# Patient Record
Sex: MALE | Race: BLACK OR AFRICAN AMERICAN | URBAN - METROPOLITAN AREA
[De-identification: names, ages, dates, MRNs, and addresses within clinical notes are randomized per-mention and may not be internally consistent; named-entity substitution may affect disease eponyms.]

---

## 2019-05-31 ENCOUNTER — NEW PATIENT (OUTPATIENT)
Dept: URBAN - METROPOLITAN AREA CLINIC 24 | Facility: CLINIC | Age: 49
End: 2019-05-31
Payer: COMMERCIAL

## 2019-05-31 DIAGNOSIS — H52.212 IRREGULAR ASTIGMATISM, LEFT EYE: ICD-10-CM

## 2019-05-31 PROCEDURE — 92025 CPTRIZED CORNEAL TOPOGRAPHY: CPT | Performed by: OPHTHALMOLOGY

## 2019-05-31 PROCEDURE — 92002 INTRM OPH EXAM NEW PATIENT: CPT | Performed by: OPHTHALMOLOGY

## 2019-05-31 RX ORDER — VALACYCLOVIR HYDROCHLORIDE 1 G/1
TABLET, FILM COATED ORAL
Qty: 180 | Refills: 2 | Status: INACTIVE
Start: 2019-05-31 | End: 2019-10-18

## 2019-05-31 ASSESSMENT — KERATOMETRY
OS: 47.05
OD: 44.19

## 2019-06-10 ENCOUNTER — FOLLOW UP ESTABLISHED (OUTPATIENT)
Dept: URBAN - METROPOLITAN AREA CLINIC 10 | Facility: CLINIC | Age: 49
End: 2019-06-10
Payer: COMMERCIAL

## 2019-06-10 PROCEDURE — 92012 INTRM OPH EXAM EST PATIENT: CPT | Performed by: OPHTHALMOLOGY

## 2019-06-10 ASSESSMENT — INTRAOCULAR PRESSURE: OS: 14

## 2019-06-18 ENCOUNTER — FOLLOW UP ESTABLISHED (OUTPATIENT)
Dept: URBAN - METROPOLITAN AREA CLINIC 10 | Facility: CLINIC | Age: 49
End: 2019-06-18
Payer: COMMERCIAL

## 2019-06-18 PROCEDURE — 92012 INTRM OPH EXAM EST PATIENT: CPT | Performed by: OPHTHALMOLOGY

## 2019-06-18 RX ORDER — PREDNISOLONE ACETATE 10 MG/ML
1 % SUSPENSION/ DROPS OPHTHALMIC
Qty: 1 | Refills: 1 | Status: INACTIVE
Start: 2019-06-18 | End: 2020-09-04

## 2019-07-02 ENCOUNTER — FOLLOW UP ESTABLISHED (OUTPATIENT)
Dept: URBAN - METROPOLITAN AREA CLINIC 10 | Facility: CLINIC | Age: 49
End: 2019-07-02
Payer: COMMERCIAL

## 2019-07-02 DIAGNOSIS — B00.52 HERPESVIRAL KERATITIS: Primary | ICD-10-CM

## 2019-07-02 DIAGNOSIS — H17.12 CENTRAL CORNEAL OPACITY, LEFT EYE: ICD-10-CM

## 2019-07-02 PROCEDURE — 92012 INTRM OPH EXAM EST PATIENT: CPT | Performed by: OPHTHALMOLOGY

## 2019-07-02 ASSESSMENT — INTRAOCULAR PRESSURE: OS: 17

## 2019-07-22 ENCOUNTER — FOLLOW UP ESTABLISHED (OUTPATIENT)
Dept: URBAN - METROPOLITAN AREA CLINIC 10 | Facility: CLINIC | Age: 49
End: 2019-07-22
Payer: COMMERCIAL

## 2019-07-22 PROCEDURE — 68761 CLOSE TEAR DUCT OPENING: CPT | Performed by: OPHTHALMOLOGY

## 2019-07-22 PROCEDURE — 92012 INTRM OPH EXAM EST PATIENT: CPT | Performed by: OPHTHALMOLOGY

## 2019-07-22 ASSESSMENT — INTRAOCULAR PRESSURE: OS: 8

## 2019-08-13 ENCOUNTER — FOLLOW UP ESTABLISHED (OUTPATIENT)
Dept: URBAN - METROPOLITAN AREA CLINIC 10 | Facility: CLINIC | Age: 49
End: 2019-08-13
Payer: COMMERCIAL

## 2019-08-13 PROCEDURE — 92012 INTRM OPH EXAM EST PATIENT: CPT | Performed by: OPHTHALMOLOGY

## 2019-09-06 ENCOUNTER — FOLLOW UP ESTABLISHED (OUTPATIENT)
Dept: URBAN - METROPOLITAN AREA CLINIC 10 | Facility: CLINIC | Age: 49
End: 2019-09-06
Payer: COMMERCIAL

## 2019-09-06 DIAGNOSIS — H16.142 PUNCTATE KERATITIS, LEFT EYE: ICD-10-CM

## 2019-09-06 PROCEDURE — 92012 INTRM OPH EXAM EST PATIENT: CPT | Performed by: OPHTHALMOLOGY

## 2019-10-18 ENCOUNTER — FOLLOW UP ESTABLISHED (OUTPATIENT)
Dept: URBAN - METROPOLITAN AREA CLINIC 24 | Facility: CLINIC | Age: 49
End: 2019-10-18
Payer: COMMERCIAL

## 2019-10-18 PROCEDURE — 92012 INTRM OPH EXAM EST PATIENT: CPT | Performed by: OPHTHALMOLOGY

## 2019-10-18 RX ORDER — VALACYCLOVIR HYDROCHLORIDE 1 G/1
TABLET, FILM COATED ORAL
Qty: 90 | Refills: 2 | Status: INACTIVE
Start: 2019-10-18 | End: 2020-02-13

## 2019-10-18 ASSESSMENT — INTRAOCULAR PRESSURE
OS: 28
OD: 8
OS: 16

## 2019-12-19 ENCOUNTER — FOLLOW UP ESTABLISHED (OUTPATIENT)
Dept: URBAN - METROPOLITAN AREA CLINIC 24 | Facility: CLINIC | Age: 49
End: 2019-12-19
Payer: COMMERCIAL

## 2019-12-19 PROCEDURE — 92012 INTRM OPH EXAM EST PATIENT: CPT | Performed by: OPHTHALMOLOGY

## 2019-12-19 ASSESSMENT — INTRAOCULAR PRESSURE
OS: 12
OD: 17

## 2020-02-13 ENCOUNTER — FOLLOW UP ESTABLISHED (OUTPATIENT)
Dept: URBAN - METROPOLITAN AREA CLINIC 24 | Facility: CLINIC | Age: 50
End: 2020-02-13
Payer: COMMERCIAL

## 2020-02-13 PROCEDURE — 92012 INTRM OPH EXAM EST PATIENT: CPT | Performed by: OPHTHALMOLOGY

## 2020-02-13 RX ORDER — VALACYCLOVIR HYDROCHLORIDE 1 G/1
TABLET, FILM COATED ORAL
Qty: 90 | Refills: 6 | Status: INACTIVE
Start: 2020-02-13 | End: 2020-09-04

## 2020-02-13 ASSESSMENT — INTRAOCULAR PRESSURE
OS: 14
OD: 18

## 2020-09-04 ENCOUNTER — NEW PATIENT (OUTPATIENT)
Dept: URBAN - METROPOLITAN AREA CLINIC 30 | Facility: CLINIC | Age: 50
End: 2020-09-04
Payer: COMMERCIAL

## 2020-09-04 DIAGNOSIS — H35.40 UNSPECIFIED PERIPHERAL RETINAL DEGENERATION: ICD-10-CM

## 2020-09-04 DIAGNOSIS — H25.813 COMBINED FORMS OF AGE-RELATED CATARACT, BILATERAL: ICD-10-CM

## 2020-09-04 DIAGNOSIS — E11.39 TYPE 2 DIABETES MELLITUS WITH OPHTHALMIC COMPLICAT: ICD-10-CM

## 2020-09-04 PROCEDURE — 92004 COMPRE OPH EXAM NEW PT 1/>: CPT | Performed by: OPTOMETRIST

## 2020-09-04 PROCEDURE — 92134 CPTRZ OPH DX IMG PST SGM RTA: CPT | Performed by: OPTOMETRIST

## 2020-09-04 RX ORDER — VALACYCLOVIR HYDROCHLORIDE 1 G/1
TABLET, FILM COATED ORAL
Qty: 90 | Refills: 2 | Status: ACTIVE
Start: 2020-09-04

## 2020-09-04 ASSESSMENT — INTRAOCULAR PRESSURE
OD: 12
OS: 12

## 2020-09-04 ASSESSMENT — VISUAL ACUITY
OS: 20/60
OD: 20/20

## 2020-09-04 ASSESSMENT — KERATOMETRY
OS: 45.24
OD: 44.05

## 2021-05-03 ENCOUNTER — OFFICE VISIT (OUTPATIENT)
Dept: URBAN - METROPOLITAN AREA CLINIC 30 | Facility: CLINIC | Age: 51
End: 2021-05-03
Payer: COMMERCIAL

## 2021-05-03 DIAGNOSIS — Z79.4 LONG TERM (CURRENT) USE OF INSULIN: ICD-10-CM

## 2021-05-03 DIAGNOSIS — E11.3313 TYPE 2 DIAB W MODERATE NONPRLF DIAB RTNOP W MACULAR EDEMA, BILATERAL: ICD-10-CM

## 2021-05-03 DIAGNOSIS — E11.3393 DIABETES MELLITUS TYPE 2 WITH MODERATE NON-PROLIFE: ICD-10-CM

## 2021-05-03 PROCEDURE — 92134 CPTRZ OPH DX IMG PST SGM RTA: CPT | Performed by: OPHTHALMOLOGY

## 2021-05-03 PROCEDURE — 99214 OFFICE O/P EST MOD 30 MIN: CPT | Performed by: OPHTHALMOLOGY

## 2021-05-03 ASSESSMENT — INTRAOCULAR PRESSURE
OD: 9
OS: 9

## 2021-05-03 NOTE — IMPRESSION/PLAN
Impression: Long term (current) use of insulin Plan: see above diabetic note A1c TOO HIGH in 11s in '21.

## 2021-05-03 NOTE — IMPRESSION/PLAN
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 Plan: Few cysts DME OS > OD. Pt is striving to improve diet and lower BG. High-resolution imaging and retinal examination confirm DM retinopathy but there is no macular edema requiring injections or laser. For now, observation is appropriate. RETINA will provide vigilant attention to the retinal issues. Non-retinal care w primary eye team.  Pt MUST follow strict rec's for DM care. RTC 4mo FA baseline OS. Monitor. No inj / Frances Chema RECHECK BP and BG   AMD DRUSEN too.

## 2021-05-03 NOTE — IMPRESSION/PLAN
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 Rec resume Valtrex 1g po QD. KEEP f/u DR. Thomas.

## 2021-10-18 ENCOUNTER — OFFICE VISIT (OUTPATIENT)
Dept: URBAN - METROPOLITAN AREA CLINIC 30 | Facility: CLINIC | Age: 51
End: 2021-10-18
Payer: COMMERCIAL

## 2021-10-18 DIAGNOSIS — H35.363 DRUSEN (DEGENERATIVE) OF MACULA, BILATERAL: ICD-10-CM

## 2021-10-18 PROCEDURE — 92250 FUNDUS PHOTOGRAPHY W/I&R: CPT | Performed by: OPHTHALMOLOGY

## 2021-10-18 PROCEDURE — 92134 CPTRZ OPH DX IMG PST SGM RTA: CPT | Performed by: OPHTHALMOLOGY

## 2021-10-18 PROCEDURE — 92235 FLUORESCEIN ANGRPH MLTIFRAME: CPT | Performed by: OPHTHALMOLOGY

## 2021-10-18 PROCEDURE — 99214 OFFICE O/P EST MOD 30 MIN: CPT | Performed by: OPHTHALMOLOGY

## 2021-10-18 ASSESSMENT — INTRAOCULAR PRESSURE
OD: 14
OS: 13

## 2021-10-18 NOTE — IMPRESSION/PLAN
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 Plan: hx: [[Few cysts DME OS > OD. Pt striving improve diet / BG. NO injx / monitor]] TODAY again NO DME in fovea. FA reassuring, but some MA. Monitor RTC 6mo colors / OCT pos. Future yearly if BG improved. KEEP gen eye care f/u. RECHECK BP and BG   AMD DRUSEN too.

## 2021-10-18 NOTE — IMPRESSION/PLAN
Impression: Drusen (degenerative) of macula Plan: DRY AMD DRUSEN. MANY on exam / FA.  OBSERVE.   DRY

## 2024-04-19 ENCOUNTER — OFFICE VISIT (OUTPATIENT)
Dept: URBAN - METROPOLITAN AREA CLINIC 30 | Facility: CLINIC | Age: 54
End: 2024-04-19
Payer: COMMERCIAL

## 2024-04-19 DIAGNOSIS — B00.52 HERPESVIRAL KERATITIS: ICD-10-CM

## 2024-04-19 DIAGNOSIS — H35.363 DRUSEN (DEGENERATIVE) OF MACULA, BILATERAL: ICD-10-CM

## 2024-04-19 DIAGNOSIS — E11.3313 TYPE 2 DIAB W MODERATE NONPRLF DIAB RTNOP W MACULAR EDEMA, BILATERAL: Primary | ICD-10-CM

## 2024-04-19 DIAGNOSIS — H25.813 COMBINED FORMS OF AGE-RELATED CATARACT, BILATERAL: ICD-10-CM

## 2024-04-19 DIAGNOSIS — H52.223 REGULAR ASTIGMATISM, BILATERAL: ICD-10-CM

## 2024-04-19 PROCEDURE — 92134 CPTRZ OPH DX IMG PST SGM RTA: CPT

## 2024-04-19 PROCEDURE — 99204 OFFICE O/P NEW MOD 45 MIN: CPT

## 2024-04-19 RX ORDER — VALACYCLOVIR HYDROCHLORIDE 1 G/1
TABLET, FILM COATED ORAL
Qty: 180 | Refills: 2 | Status: ACTIVE
Start: 2024-04-19

## 2024-04-19 ASSESSMENT — VISUAL ACUITY
OD: 20/25
OS: 20/40

## 2024-04-19 ASSESSMENT — INTRAOCULAR PRESSURE
OD: 12
OS: 11

## 2024-04-19 ASSESSMENT — KERATOMETRY
OD: 44.19
OS: 45.63

## 2024-05-06 ENCOUNTER — OFFICE VISIT (OUTPATIENT)
Dept: URBAN - METROPOLITAN AREA CLINIC 30 | Facility: CLINIC | Age: 54
End: 2024-05-06

## 2024-05-06 DIAGNOSIS — H52.7 DISORDER OF REFRACTION: Primary | ICD-10-CM

## 2024-05-06 DIAGNOSIS — H52.223 REGULAR ASTIGMATISM, BILATERAL: ICD-10-CM

## 2024-05-06 PROCEDURE — 92015 DETERMINE REFRACTIVE STATE: CPT

## 2024-05-06 ASSESSMENT — INTRAOCULAR PRESSURE
OS: 12
OD: 12

## 2024-05-06 ASSESSMENT — KERATOMETRY
OD: 44.09
OS: 44.80

## 2024-05-06 ASSESSMENT — VISUAL ACUITY
OD: 20/20
OS: 20/40

## 2024-07-29 ENCOUNTER — OFFICE VISIT (OUTPATIENT)
Dept: URBAN - METROPOLITAN AREA CLINIC 30 | Facility: CLINIC | Age: 54
End: 2024-07-29
Payer: COMMERCIAL

## 2024-07-29 DIAGNOSIS — E11.3313 TYPE 2 DIAB W MODERATE NONPRLF DIAB RTNOP W MACULAR EDEMA, BILATERAL: ICD-10-CM

## 2024-07-29 DIAGNOSIS — H52.223 REGULAR ASTIGMATISM, BILATERAL: ICD-10-CM

## 2024-07-29 DIAGNOSIS — H52.7 DISORDER OF REFRACTION: Primary | ICD-10-CM

## 2024-07-29 PROCEDURE — 92015 DETERMINE REFRACTIVE STATE: CPT

## 2024-07-29 ASSESSMENT — VISUAL ACUITY
OS: 20/40
OD: 20/20